# Patient Record
Sex: FEMALE | Race: WHITE
[De-identification: names, ages, dates, MRNs, and addresses within clinical notes are randomized per-mention and may not be internally consistent; named-entity substitution may affect disease eponyms.]

---

## 2021-04-17 ENCOUNTER — HOSPITAL ENCOUNTER (EMERGENCY)
Dept: HOSPITAL 75 - ER FS | Age: 66
Discharge: HOME | End: 2021-04-17
Payer: SELF-PAY

## 2021-04-17 VITALS — DIASTOLIC BLOOD PRESSURE: 67 MMHG | SYSTOLIC BLOOD PRESSURE: 118 MMHG

## 2021-04-17 DIAGNOSIS — R55: Primary | ICD-10-CM

## 2021-04-17 DIAGNOSIS — K22.2: ICD-10-CM

## 2021-04-17 DIAGNOSIS — U07.1: ICD-10-CM

## 2021-04-17 LAB
ALBUMIN SERPL-MCNC: 3.9 GM/DL (ref 3.2–4.5)
ALP SERPL-CCNC: 88 U/L (ref 40–136)
ALT SERPL-CCNC: 19 U/L (ref 0–55)
BASOPHILS # BLD AUTO: 0 10^3/UL (ref 0–0.1)
BASOPHILS NFR BLD AUTO: 0 % (ref 0–10)
BILIRUB SERPL-MCNC: 0.2 MG/DL (ref 0.1–1)
BUN/CREAT SERPL: 30
CALCIUM SERPL-MCNC: 8.6 MG/DL (ref 8.5–10.1)
CHLORIDE SERPL-SCNC: 104 MMOL/L (ref 98–107)
CO2 SERPL-SCNC: 24 MMOL/L (ref 21–32)
CREAT SERPL-MCNC: 0.82 MG/DL (ref 0.6–1.3)
EOSINOPHIL # BLD AUTO: 0 10^3/UL (ref 0–0.3)
EOSINOPHIL NFR BLD AUTO: 0 % (ref 0–10)
GFR SERPLBLD BASED ON 1.73 SQ M-ARVRAT: > 60 ML/MIN
GLUCOSE SERPL-MCNC: 166 MG/DL (ref 70–105)
HCT VFR BLD CALC: 42 % (ref 35–52)
HGB BLD-MCNC: 13.8 G/DL (ref 11.5–16)
LYMPHOCYTES # BLD AUTO: 0.7 X 10^3 (ref 1–4)
LYMPHOCYTES NFR BLD AUTO: 7 % (ref 12–44)
MANUAL DIFFERENTIAL PERFORMED BLD QL: YES
MCH RBC QN AUTO: 29 PG (ref 25–34)
MCHC RBC AUTO-ENTMCNC: 33 G/DL (ref 32–36)
MCV RBC AUTO: 88 FL (ref 80–99)
MONOCYTES # BLD AUTO: 0.5 X 10^3 (ref 0–1)
MONOCYTES NFR BLD AUTO: 5 % (ref 0–12)
MONOCYTES NFR BLD: 2 %
NEUTROPHILS # BLD AUTO: 8.2 X 10^3 (ref 1.8–7.8)
NEUTROPHILS NFR BLD AUTO: 87 % (ref 42–75)
NEUTS BAND NFR BLD MANUAL: 90 %
NEUTS BAND NFR BLD: 2 %
PLATELET # BLD: 230 10^3/UL (ref 130–400)
PMV BLD AUTO: 10.7 FL (ref 7.4–10.4)
POTASSIUM SERPL-SCNC: 4 MMOL/L (ref 3.6–5)
PROT SERPL-MCNC: 6.8 GM/DL (ref 6.4–8.2)
SODIUM SERPL-SCNC: 141 MMOL/L (ref 135–145)
VARIANT LYMPHS NFR BLD MANUAL: 6 %
WBC # BLD AUTO: 9.4 10^3/UL (ref 4.3–11)

## 2021-04-17 PROCEDURE — 85379 FIBRIN DEGRADATION QUANT: CPT

## 2021-04-17 PROCEDURE — 80053 COMPREHEN METABOLIC PANEL: CPT

## 2021-04-17 PROCEDURE — 84484 ASSAY OF TROPONIN QUANT: CPT

## 2021-04-17 PROCEDURE — 71045 X-RAY EXAM CHEST 1 VIEW: CPT

## 2021-04-17 PROCEDURE — 36415 COLL VENOUS BLD VENIPUNCTURE: CPT

## 2021-04-17 PROCEDURE — 93005 ELECTROCARDIOGRAM TRACING: CPT

## 2021-04-17 PROCEDURE — 86141 C-REACTIVE PROTEIN HS: CPT

## 2021-04-17 PROCEDURE — 85007 BL SMEAR W/DIFF WBC COUNT: CPT

## 2021-04-17 PROCEDURE — 85027 COMPLETE CBC AUTOMATED: CPT

## 2021-04-17 NOTE — ED CHEST PAIN
General


Chief Complaint:  Chest Pain


Stated Complaint:  SYNCOPE/HEAD INJ


Source:  patient





History of Present Illness


Date Seen by Provider:  Apr 17, 2021


Time Seen by Provider:  13:40


Initial Comments


66-year-old female presents a couple hours after she passed out at home.  

Patient states that she took a bite of a sandwich and then a drink of her cola 

and is since she swallowed she had pain in her lower chest (the food was not 

going down), she walked across the room to the kitchen sink and was leaning over

the sink and pain and the next thing she recalls she woke up on the floor.  Her 

 was there to help her up, she did hit her head on the floor when she 

fell.  She regained consciousness and was awake and alert and came to the ER for

evaluation as she was still having some mild lower chest pain, although it was 

nearly resolved on arrival.  Patient denies any heart history.  She did test 

positive for Covid 4 days ago and has had mostly body aches and muscle aches 

since then with no difficulty breathing or chest pain symptoms. She is taking an

appropriate assortment of vitamins.





Allergies and Home Medications


Allergies


Coded Allergies:  


     No Known Allergies (Unverified  Allergy, Unknown, 6/4/06)


Uncoded Allergies:  


     WATERMELON (Allergy, Unknown, 6/4/06)





Home Medications


Ivermectin 3 Mg Tablet, 15 MG PO DAILY


   Prescribed by: EDNA CHOUDHARY on 4/17/21 1441





Patient Home Medication List


Home Medication List Reviewed:  Yes





Review of Systems


Review of Systems


Constitutional:  No chills, No fever; malaise; No weakness


EENTM:  No Symptoms Reported


Respiratory:  Denies Cough, Denies Shortness of Air


Cardiovascular:  See HPI; Denies Chest Pain (resolving), Denies Edema, Denies 

Irregular Heart Rate, Denies Lightheadedness, Denies Palpitations; Syncope


Gastrointestinal:  Denies Abdominal Pain, Denies Nausea, Denies Vomiting


Musculoskeletal:  No back pain, No joint pain; muscle pain; No muscle weakness, 

No neck pain


Skin:  No change in color, No rash


Psychiatric/Neurological:  Headache (slight, left sided); Denies Numbness, 

Denies Paresthesia, Denies Weakness





Past Medical-Social-Family Hx


Past Med/Social Hx:  Reviewed Nursing Past Med/Soc Hx


Patient Social History


Alcohol Use:  Denies Use


2nd Hand Smoke Exposure:  No


Recent Hopitalizations:  No





Seasonal Allergies


Seasonal Allergies:  No





Past Medical History


Surgeries:  Yes (neck)


Appendectomy, Hysterectomy, Lumpectomy, Orthopedic


Respiratory:  No


Cardiac:  Yes


Heart Murmur, High Cholesterol


Neurological:  No


Genitourinary:  No


Gastrointestinal:  No


Musculoskeletal:  No


Endocrine:  No


HEENT:  No


Cancer:  No


Psychosocial:  No


Integumentary:  No


Blood Disorders:  No


Adverse Reaction/Blood Tranf:  No





Physical Exam


Vital Signs





Vital Signs - First Documented








 4/17/21





 13:40


 


Temp 36.6


 


Pulse 65


 


Resp 19


 


B/P (MAP) 117/67 (84)


 


Pulse Ox 96





Capillary Refill :


Height, Weight, BMI


Height: '"


Weight: lbs. oz. kg;  BMI


Method:


General Appearance:  No Apparent Distress, WD/WN


HEENT:  PERRL/EOMI, TMs Normal, Normal ENT Inspection


Neck:  Full Range of Motion, Normal Inspection, Non Tender, Supple


Respiratory:  Chest Non Tender, Lungs Clear, Normal Breath Sounds, No Accessory 

Muscle Use, No Respiratory Distress


Cardiovascular:  Regular Rate, Rhythm, No Edema, No Gallop, No JVD, Normal 

Peripheral Pulses


Gastrointestinal:  Non Tender, Soft


Extremity:  Normal Capillary Refill, Non Tender


Neurologic/Psychiatric:  Alert, Oriented x3, No Motor/Sensory Deficits, Normal 

Mood/Affect, CNs II-XII Norm as Tested


Skin:  Normal Color, Warm/Dry





Progress/Results/Core Measures


Results/Orders


Lab Results





Laboratory Tests








Test


 4/17/21


13:54 Range/Units


 


 


White Blood Count


 9.4 


 4.3-11.0


10^3/uL


 


Red Blood Count


 4.76 


 4.35-5.85


10^6/uL


 


Hemoglobin 13.8  11.5-16.0  G/DL


 


Hematocrit 42  35-52  %


 


Mean Corpuscular Volume 88  80-99  FL


 


Mean Corpuscular Hemoglobin 29  25-34  PG


 


Mean Corpuscular Hemoglobin


Concent 33 


 32-36  G/DL





 


Red Cell Distribution Width 13.9  10.0-14.5  %


 


Platelet Count


 230 


 130-400


10^3/uL


 


Mean Platelet Volume 10.7 H 7.4-10.4  FL


 


Immature Granulocyte % (Auto) 0   %


 


Neutrophils (%) (Auto) 87 H 42-75  %


 


Lymphocytes (%) (Auto) 7 L 12-44  %


 


Monocytes (%) (Auto) 5  0-12  %


 


Eosinophils (%) (Auto) 0  0-10  %


 


Basophils (%) (Auto) 0  0-10  %


 


Neutrophils # (Auto) 8.2 H 1.8-7.8  X 10^3


 


Lymphocytes # (Auto) 0.7 L 1.0-4.0  X 10^3


 


Monocytes # (Auto) 0.5  0.0-1.0  X 10^3


 


Eosinophils # (Auto)


 0.0 


 0.0-0.3


10^3/uL


 


Basophils # (Auto)


 0.0 


 0.0-0.1


10^3/uL


 


Immature Granulocyte # (Auto)


 0.0 


 0.0-0.1


10^3/uL


 


Neutrophils % (Manual) 90   %


 


Lymphocytes % (Manual) 6   %


 


Monocytes % (Manual) 2   %


 


Band Neutrophils 2   %


 


D-Dimer


 0.46 


 0.00-0.49


UG/ML


 


Sodium Level 141  135-145  MMOL/L


 


Potassium Level 4.0  3.6-5.0  MMOL/L


 


Chloride Level 104    MMOL/L


 


Carbon Dioxide Level 24  21-32  MMOL/L


 


Anion Gap 13  5-14  MMOL/L


 


Blood Urea Nitrogen 25 H 7-18  MG/DL


 


Creatinine


 0.82 


 0.60-1.30


MG/DL


 


Estimat Glomerular Filtration


Rate > 60 


  





 


BUN/Creatinine Ratio 30   


 


Glucose Level 166 H   MG/DL


 


Calcium Level 8.6  8.5-10.1  MG/DL


 


Corrected Calcium 8.7  8.5-10.1  MG/DL


 


Total Bilirubin 0.2  0.1-1.0  MG/DL


 


Aspartate Amino Transf


(AST/SGOT) 22 


 5-34  U/L





 


Alanine Aminotransferase


(ALT/SGPT) 19 


 0-55  U/L





 


Alkaline Phosphatase 88    U/L


 


Troponin I < 0.30  <0.30  NG/ML


 


C-Reactive Protein 3.00 H <0.50  MG/DL


 


Total Protein 6.8  6.4-8.2  GM/DL


 


Albumin 3.9  3.2-4.5  GM/DL








My Orders





Orders - ROVENSTINEEDNA DO


Ed Iv/Invasive Line Start (4/17/21 13:48)


Cbc With Automated Diff (4/17/21 13:48)


Comprehensive Metabolic Panel (4/17/21 13:48)


Troponin I Fs (4/17/21 13:48)


Chest 1 View Ap/Pa Only (4/17/21 13:48)


Ekg Tracing (4/17/21 13:48)


Crp Fs (4/17/21 13:48)


Fibrin Degradation Products (4/17/21 13:48)


Manual Differential (4/17/21 13:54)





Vital Signs/I&O











 4/17/21 4/17/21





 13:40 15:10


 


Temp 36.6 


 


Pulse 65 60


 


Resp 19 19


 


B/P (MAP) 117/67 (84) 118/67


 


Pulse Ox 96 96











Progress


Progress Note :  


Progress Note


Patient well-appearing and in no acute distress with normal vital signs and 

laboratory evaluation.  Chest x-ray with minimal infiltrate in the left lung 

consistent with Covid.  Discussed outpatient treatment and follow-up if not 

improving or if worse.  All questions were answered by patient.  Advise follow-

up with her PCP to consider an upper GI endoscopy if she is having any further 

trouble swallowing.


Initial ECG Impression Date:  Apr 17, 2021


Initial ECG Impression Time:  13:40


Initial ECG Rate:  62


Initial ECG Rhythm:  Normal Sinus


Initial ECG Intervals:  Normal


Initial ECG Impression:  Normal





Diagnostic Imaging





   Diagonstic Imaging:  Xray


   Plain Films/CT/US/NM/MRI:  chest


Comments


Date of Exam:04/17/21





CHEST 1 VIEW AP/PA ONLY








Portable erect AP chest at 2:02 





Indication:  Chest pain





There is shallow inspiration when compared to the prior exam of


07/14/2011. Allowing for this technical factor, the heart size is


stable when compared to the previous study. However in the


interval since the prior exam a patchy alveolar/interstitial


infiltrate has developed in the left lung base near the apex of


the heart. I suspect that this is secondary to


pneumonia/atelectasis. Left upper lung and right lung are


generally clear. The mediastinum is not widened. The osseous


structures are intact. 





Impression:  The appearance of the chest has worsened since the


prior study as pneumonia/atelectasis has developed in the left


lung base. A followup study would be recommended for continued


evaluation.








  Dictated on workstation # [id number]








Dict:   04/17/21 1434


Trans:   04/17/21 1447


CVB [id number]





Interpreted by:     ANGELA FRITZ MD


Electronically signed by:





Departure


Impression





   Primary Impression:  


   Syncope


   Qualified Codes:  R55 - Syncope and collapse


   Additional Impressions:  


   Esophageal obstruction


   COVID-19


Disposition:  01 HOME, SELF-CARE


Condition:  Stable





Departure-Patient Inst.


Decision time for Depature:  14:45


Referrals:  


CRISTIAN NIELSEN MD (PCP/Family)


Primary Care Physician


Patient Instructions:  Chest Pain (DC), Syncope (Fainting), Esophageal Stricture

(DC), COVID-19 Overview





Add. Discharge Instructions:  


See your PCP in 5 to 7 days to discuss today's event (passing out after food 

stuck in your esophagus).  You may need an upper GI endoscopy to evaluate you 

esophagus for a partial narrowing or stricture).  Be careful about what you eat 

and chew your food well to prevent another similar event.  





Take your prescription as instructed.  REturn to the ER for any further episodes

of chest pain or passing out.





All discharge instructions reviewed with patient and/or family. Voiced 

understanding.


Scripts


Ivermectin (Ivermectin) 3 Mg Tablet


15 MG PO DAILY, #25 TAB


   Prov: EDNA CHOUDHARY DO         4/17/21











EDNA CHOUDHARY DO         Apr 17, 2021 13:55

## 2021-04-17 NOTE — DIAGNOSTIC IMAGING REPORT
Portable erect AP chest at 2:02 



Indication:  Chest pain



There is shallow inspiration when compared to the prior exam of

07/14/2011. Allowing for this technical factor, the heart size is

stable when compared to the previous study. However in the

interval since the prior exam a patchy alveolar/interstitial

infiltrate has developed in the left lung base near the apex of

the heart. I suspect that this is secondary to

pneumonia/atelectasis. Left upper lung and right lung are

generally clear. The mediastinum is not widened. The osseous

structures are intact. 



Impression:  The appearance of the chest has worsened since the

prior study as pneumonia/atelectasis has developed in the left

lung base. A followup study would be recommended for continued

evaluation.



Dictated by: 



  Dictated on workstation # [id number]

## 2021-04-21 ENCOUNTER — HOSPITAL ENCOUNTER (OUTPATIENT)
Dept: HOSPITAL 75 - INFUSION | Age: 66
End: 2021-04-21
Attending: NURSE PRACTITIONER
Payer: COMMERCIAL

## 2021-04-21 VITALS — SYSTOLIC BLOOD PRESSURE: 135 MMHG | DIASTOLIC BLOOD PRESSURE: 76 MMHG

## 2021-04-21 VITALS — SYSTOLIC BLOOD PRESSURE: 119 MMHG | DIASTOLIC BLOOD PRESSURE: 73 MMHG

## 2021-04-21 DIAGNOSIS — U07.1: ICD-10-CM

## 2021-04-21 DIAGNOSIS — Z23: Primary | ICD-10-CM

## 2022-06-13 ENCOUNTER — HOSPITAL ENCOUNTER (OUTPATIENT)
Dept: HOSPITAL 75 - RAD FS | Age: 67
End: 2022-06-13
Attending: REGISTERED NURSE
Payer: SELF-PAY

## 2022-06-13 DIAGNOSIS — Z82.49: ICD-10-CM

## 2022-06-13 DIAGNOSIS — E78.00: Primary | ICD-10-CM

## 2022-06-13 PROCEDURE — 75571 CT HRT W/O DYE W/CA TEST: CPT

## 2022-06-13 NOTE — DIAGNOSTIC IMAGING REPORT
INDICATION: 

Family history of coronary artery disease and elevated

cholesterol.



TECHNIQUE: 

The CT cardiac calcium scoring study was performed with

noncontrast images of the heart followed by coronary calcium

scoring.



FINDINGS:

Raw data images demonstrate some atherosclerotic plaquing of the

aorta without evidence of aneurysm. There are calcified nodes in

the mediastinum, compatible with old granulomatous disease. The

visualized portions of the lung fields show no abnormalities. The

entirety of the lungs is not included on this study.



Cardiac calcium scoring was performed. A score of 0.6 was

obtained in the LAD with small calcifications in its proximal to

midportion. No calcifications are seen in the rest of the

coronary territories.



IMPRESSION:

The coronary calcium score was 0.6 for plaquing in the LAD. The

finding is compatible with minimal overall plaque burden.



Dictated by: 



  Dictated on workstation # [id number]

## 2022-07-13 ENCOUNTER — HOSPITAL ENCOUNTER (OUTPATIENT)
Dept: HOSPITAL 75 - LAB FS | Age: 67
End: 2022-07-13
Attending: REGISTERED NURSE
Payer: MEDICARE

## 2022-07-13 DIAGNOSIS — R03.0: ICD-10-CM

## 2022-07-13 DIAGNOSIS — J30.2: ICD-10-CM

## 2022-07-13 DIAGNOSIS — M17.12: Primary | ICD-10-CM

## 2022-07-13 DIAGNOSIS — E78.5: ICD-10-CM

## 2022-07-13 DIAGNOSIS — Z71.89: ICD-10-CM

## 2022-07-13 LAB
ALBUMIN SERPL-MCNC: 4.5 GM/DL (ref 3.2–4.5)
ALP SERPL-CCNC: 103 U/L (ref 40–136)
ALT SERPL-CCNC: 20 U/L (ref 0–55)
BILIRUB SERPL-MCNC: 0.2 MG/DL (ref 0.1–1)
BUN/CREAT SERPL: 22
CALCIUM SERPL-MCNC: 9.3 MG/DL (ref 8.5–10.1)
CHLORIDE SERPL-SCNC: 106 MMOL/L (ref 98–107)
CHOLEST SERPL-MCNC: 231 MG/DL (ref ?–200)
CO2 SERPL-SCNC: 26 MMOL/L (ref 21–32)
CREAT SERPL-MCNC: 0.76 MG/DL (ref 0.6–1.3)
GFR SERPLBLD BASED ON 1.73 SQ M-ARVRAT: 87 ML/MIN
GLUCOSE SERPL-MCNC: 102 MG/DL (ref 70–105)
HDLC SERPL-MCNC: 69 MG/DL (ref 40–60)
POTASSIUM SERPL-SCNC: 4.6 MMOL/L (ref 3.6–5)
PROT SERPL-MCNC: 7.8 GM/DL (ref 6.4–8.2)
SODIUM SERPL-SCNC: 142 MMOL/L (ref 135–145)
TRIGL SERPL-MCNC: 63 MG/DL (ref ?–150)
VLDLC SERPL CALC-MCNC: 13 MG/DL (ref 5–40)

## 2022-07-13 PROCEDURE — 80053 COMPREHEN METABOLIC PANEL: CPT

## 2022-07-13 PROCEDURE — 73562 X-RAY EXAM OF KNEE 3: CPT

## 2022-07-13 PROCEDURE — 36415 COLL VENOUS BLD VENIPUNCTURE: CPT

## 2022-07-13 PROCEDURE — 80061 LIPID PANEL: CPT

## 2022-07-13 NOTE — DIAGNOSTIC IMAGING REPORT
Indication: Left knee pain.



Time of Exam: 10:03 AM



3 views of the left knee were obtained. Alignment is normal.

Joint spaces are well-maintained apart from minimal medial and

patellofemoral compartmental narrowing. There is some mild

marginal spurring present. No fracture, dislocation or effusion

is identified.



Impression: Mild degenerative changes. No acute bony abnormality

is detected.



Dictated by: 



  Dictated on workstation # [id number]

## 2022-08-30 ENCOUNTER — HOSPITAL ENCOUNTER (OUTPATIENT)
Dept: HOSPITAL 75 - CARDFS | Age: 67
End: 2022-08-30
Attending: INTERNAL MEDICINE
Payer: MEDICARE

## 2022-08-30 DIAGNOSIS — R06.09: Primary | ICD-10-CM

## 2022-08-30 PROCEDURE — 71046 X-RAY EXAM CHEST 2 VIEWS: CPT

## 2022-08-30 PROCEDURE — 93306 TTE W/DOPPLER COMPLETE: CPT

## 2022-08-30 NOTE — DIAGNOSTIC IMAGING REPORT
Indication: Dyspnea on exertion.



Time of Exam: 12:19 PM



Comparison is made to prior chest from 04/17/2021.



Findings: The heart size is normal. The pulmonary vascularity is

unremarkable. The lungs are clear. No infiltrate, effusion or

pneumothorax is detected.



Impression: No acute cardiopulmonary process is detected.



Dictated by: 



  Dictated on workstation # [id number]

## 2022-11-23 ENCOUNTER — HOSPITAL ENCOUNTER (OUTPATIENT)
Dept: HOSPITAL 75 - RAD FS | Age: 67
End: 2022-11-23
Attending: REGISTERED NURSE
Payer: MEDICARE

## 2022-11-23 DIAGNOSIS — M17.11: Primary | ICD-10-CM

## 2022-11-23 PROCEDURE — 73562 X-RAY EXAM OF KNEE 3: CPT

## 2022-11-23 NOTE — DIAGNOSTIC IMAGING REPORT
INDICATION: Right knee pain



AP, oblique, lateral views the right knee are obtained.



No fracture or acute bony abnormality seen. There is

patellofemoral spurring. There are some chronic changes in the

tibial spines.



IMPRESSION:



Mild degenerative changes with no acute abnormality of the right

knee.



Dictated by: 



  Dictated on workstation # [id number]

## 2023-08-22 ENCOUNTER — HOSPITAL ENCOUNTER (OUTPATIENT)
Dept: HOSPITAL 75 - LAB FS | Age: 68
End: 2023-08-22
Attending: REGISTERED NURSE
Payer: MEDICARE

## 2023-08-22 DIAGNOSIS — R19.7: ICD-10-CM

## 2023-08-22 DIAGNOSIS — B35.4: ICD-10-CM

## 2023-08-22 DIAGNOSIS — R10.32: Primary | ICD-10-CM

## 2023-08-22 LAB
ALBUMIN SERPL-MCNC: 4.8 GM/DL (ref 3.2–4.5)
ALP SERPL-CCNC: 113 U/L (ref 40–136)
ALT SERPL-CCNC: 12 U/L (ref 0–55)
BASOPHILS # BLD AUTO: 0.1 10^3/UL (ref 0–0.1)
BASOPHILS NFR BLD AUTO: 1 % (ref 0–10)
BILIRUB SERPL-MCNC: 0.2 MG/DL (ref 0.1–1)
BUN/CREAT SERPL: 24
CALCIUM SERPL-MCNC: 9.7 MG/DL (ref 8.5–10.1)
CHLORIDE SERPL-SCNC: 104 MMOL/L (ref 98–107)
CO2 SERPL-SCNC: 23 MMOL/L (ref 21–32)
CREAT SERPL-MCNC: 0.72 MG/DL (ref 0.6–1.3)
EOSINOPHIL # BLD AUTO: 0.2 10^3/UL (ref 0–0.3)
EOSINOPHIL NFR BLD AUTO: 2 % (ref 0–10)
GFR SERPLBLD BASED ON 1.73 SQ M-ARVRAT: 92 ML/MIN
GLUCOSE SERPL-MCNC: 88 MG/DL (ref 70–105)
HCT VFR BLD CALC: 43 % (ref 35–52)
HGB BLD-MCNC: 13.6 G/DL (ref 11.5–16)
LIPASE SERPL-CCNC: 18 U/L (ref 8–78)
LYMPHOCYTES # BLD AUTO: 2.6 10^3/UL (ref 1–4)
LYMPHOCYTES NFR BLD AUTO: 29 % (ref 12–44)
MANUAL DIFFERENTIAL PERFORMED BLD QL: NO
MCH RBC QN AUTO: 29 PG (ref 25–34)
MCHC RBC AUTO-ENTMCNC: 32 G/DL (ref 32–36)
MCV RBC AUTO: 90 FL (ref 80–99)
MONOCYTES # BLD AUTO: 0.6 10^3/UL (ref 0–1)
MONOCYTES NFR BLD AUTO: 6 % (ref 0–12)
NEUTROPHILS # BLD AUTO: 5.7 10^3/UL (ref 1.8–7.8)
NEUTROPHILS NFR BLD AUTO: 62 % (ref 42–75)
PLATELET # BLD: 315 10^3/UL (ref 130–400)
PMV BLD AUTO: 10.8 FL (ref 9–12.2)
POTASSIUM SERPL-SCNC: 4.1 MMOL/L (ref 3.6–5)
PROT SERPL-MCNC: 7.4 GM/DL (ref 6.4–8.2)
SODIUM SERPL-SCNC: 141 MMOL/L (ref 135–145)
WBC # BLD AUTO: 9.2 10^3/UL (ref 4.3–11)

## 2023-08-22 PROCEDURE — 83690 ASSAY OF LIPASE: CPT

## 2023-08-22 PROCEDURE — 36415 COLL VENOUS BLD VENIPUNCTURE: CPT

## 2023-08-22 PROCEDURE — 74177 CT ABD & PELVIS W/CONTRAST: CPT

## 2023-08-22 PROCEDURE — 85025 COMPLETE CBC W/AUTO DIFF WBC: CPT

## 2023-08-22 PROCEDURE — 80053 COMPREHEN METABOLIC PANEL: CPT

## 2023-08-22 NOTE — DIAGNOSTIC IMAGING REPORT
PROCEDURE: CT abdomen and pelvis with contrast.



TECHNIQUE: Multiple contiguous axial images were obtained through

the abdomen and pelvis after administration of intravenous

contrast. Auto Exposure Controls were utilized during the CT exam

to meet ALARA standards for radiation dose reduction. All CT

scans use one or more of the following dose optimizing

techniques: Automated exposure control, MA and/or KvP adjustment

based on patient size and exam type or iterative reconstruction.



INDICATION: Diarrheal state, left lower quadrant pain.



FINDINGS: There are diverticular changes to the sigmoid colon

without diverticulitis, obstruction, perforation, or fistula. No

focal hollow visceral inflammatory changes found. There are some

right-sided diverticula, also nonacute. There is some fatty

atrophy of the pancreas which appeared nonacute. The liver,

gallbladder, bile ducts, spleen, and adrenals are negative. The

kidneys are unobstructed. Some prominent fatty deposition of the

walls of the colon diffusely is consistent with some chronic

inflammation. No features of active or acute focal or generalized

colitis.



IMPRESSION:



1. Sigmoidal and pancolonic diverticulosis without

diverticulitis.



2. No bowel, biliary, or urinary tract obstruction; acute

inflammatory process; ascites; or fluid collections.



3. Some chronic fatty pancreatic atrophy and chronic fatty

deposition of the colonic walls. No acute finding.



Dictated by: 



  Dictated on workstation # [id number]